# Patient Record
Sex: MALE | Race: WHITE | NOT HISPANIC OR LATINO
[De-identification: names, ages, dates, MRNs, and addresses within clinical notes are randomized per-mention and may not be internally consistent; named-entity substitution may affect disease eponyms.]

---

## 2018-10-29 ENCOUNTER — APPOINTMENT (OUTPATIENT)
Dept: INTERNAL MEDICINE | Facility: CLINIC | Age: 48
End: 2018-10-29
Payer: COMMERCIAL

## 2018-10-29 ENCOUNTER — TRANSCRIPTION ENCOUNTER (OUTPATIENT)
Age: 48
End: 2018-10-29

## 2018-10-29 ENCOUNTER — NON-APPOINTMENT (OUTPATIENT)
Age: 48
End: 2018-10-29

## 2018-10-29 VITALS
HEART RATE: 65 BPM | WEIGHT: 178 LBS | HEIGHT: 68.5 IN | SYSTOLIC BLOOD PRESSURE: 103 MMHG | OXYGEN SATURATION: 99 % | DIASTOLIC BLOOD PRESSURE: 71 MMHG | BODY MASS INDEX: 26.67 KG/M2 | TEMPERATURE: 97.8 F

## 2018-10-29 DIAGNOSIS — Z83.79 FAMILY HISTORY OF OTHER DISEASES OF THE DIGESTIVE SYSTEM: ICD-10-CM

## 2018-10-29 DIAGNOSIS — Z23 ENCOUNTER FOR IMMUNIZATION: ICD-10-CM

## 2018-10-29 DIAGNOSIS — Z80.42 FAMILY HISTORY OF MALIGNANT NEOPLASM OF PROSTATE: ICD-10-CM

## 2018-10-29 DIAGNOSIS — Z78.9 OTHER SPECIFIED HEALTH STATUS: ICD-10-CM

## 2018-10-29 DIAGNOSIS — Z12.5 ENCOUNTER FOR SCREENING FOR MALIGNANT NEOPLASM OF PROSTATE: ICD-10-CM

## 2018-10-29 DIAGNOSIS — Z11.3 ENCOUNTER FOR SCREENING FOR INFECTIONS WITH A PREDOMINANTLY SEXUAL MODE OF TRANSMISSION: ICD-10-CM

## 2018-10-29 PROCEDURE — 99386 PREV VISIT NEW AGE 40-64: CPT | Mod: 25

## 2018-10-29 PROCEDURE — G0444 DEPRESSION SCREEN ANNUAL: CPT

## 2018-10-29 PROCEDURE — 36415 COLL VENOUS BLD VENIPUNCTURE: CPT

## 2018-10-29 RX ORDER — MINOXIDIL 5 %
5 FOAM (GRAM) TOPICAL
Refills: 0 | Status: ACTIVE | COMMUNITY

## 2018-10-29 NOTE — HISTORY OF PRESENT ILLNESS
[de-identified] : 48 year old male patient came in to establish care and for preventive visit.\par \par Patient exercises 4 times weekly. 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 \par \par Patient is MSM is  to male partner. has been together for 10.5 years. They have been monogamous but they are opening the relationship. hasn’t been sexually active outside marriage yet. Admits to insertive anal intercourse. \par \par

## 2018-10-31 ENCOUNTER — TRANSCRIPTION ENCOUNTER (OUTPATIENT)
Age: 48
End: 2018-10-31

## 2018-10-31 LAB
ALBUMIN SERPL ELPH-MCNC: 5.1 G/DL
ALP BLD-CCNC: 74 U/L
ALT SERPL-CCNC: 29 U/L
ANION GAP SERPL CALC-SCNC: 14 MMOL/L
APPEARANCE: CLEAR
AST SERPL-CCNC: 39 U/L
BACTERIA: NEGATIVE
BASOPHILS # BLD AUTO: 0.06 K/UL
BASOPHILS NFR BLD AUTO: 1 %
BILIRUB SERPL-MCNC: 1.1 MG/DL
BILIRUBIN URINE: NEGATIVE
BLOOD URINE: NEGATIVE
BUN SERPL-MCNC: 18 MG/DL
C TRACH RRNA SPEC QL NAA+PROBE: NOT DETECTED
CALCIUM SERPL-MCNC: 10 MG/DL
CHLORIDE SERPL-SCNC: 100 MMOL/L
CHOLEST SERPL-MCNC: 207 MG/DL
CHOLEST/HDLC SERPL: 4.2 RATIO
CO2 SERPL-SCNC: 25 MMOL/L
COLOR: YELLOW
CREAT SERPL-MCNC: 1.27 MG/DL
EOSINOPHIL # BLD AUTO: 0.18 K/UL
EOSINOPHIL NFR BLD AUTO: 3 %
GLUCOSE QUALITATIVE U: NEGATIVE MG/DL
GLUCOSE SERPL-MCNC: 86 MG/DL
HBA1C MFR BLD HPLC: 5.4 %
HBV SURFACE AB SER QL: NONREACTIVE
HCT VFR BLD CALC: 47.7 %
HCV AB SER QL: NONREACTIVE
HCV S/CO RATIO: 0.42 S/CO
HDLC SERPL-MCNC: 49 MG/DL
HEPATITIS A IGG ANTIBODY: REACTIVE
HGB BLD-MCNC: 16.2 G/DL
HIV1+2 AB SPEC QL IA.RAPID: NONREACTIVE
IMM GRANULOCYTES NFR BLD AUTO: 0.2 %
KETONES URINE: NEGATIVE
LDLC SERPL CALC-MCNC: 104 MG/DL
LEUKOCYTE ESTERASE URINE: NEGATIVE
LYMPHOCYTES # BLD AUTO: 2.48 K/UL
LYMPHOCYTES NFR BLD AUTO: 41.5 %
MAN DIFF?: NORMAL
MCHC RBC-ENTMCNC: 30.4 PG
MCHC RBC-ENTMCNC: 34 GM/DL
MCV RBC AUTO: 89.5 FL
MICROSCOPIC-UA: NORMAL
MONOCYTES # BLD AUTO: 0.5 K/UL
MONOCYTES NFR BLD AUTO: 8.4 %
N GONORRHOEA RRNA SPEC QL NAA+PROBE: NOT DETECTED
NEUTROPHILS # BLD AUTO: 2.75 K/UL
NEUTROPHILS NFR BLD AUTO: 45.9 %
NITRITE URINE: NEGATIVE
PH URINE: 5.5
PLATELET # BLD AUTO: 200 K/UL
POTASSIUM SERPL-SCNC: 4.7 MMOL/L
PROT SERPL-MCNC: 8.2 G/DL
PROTEIN URINE: NEGATIVE MG/DL
PSA FREE FLD-MCNC: 17.5
PSA FREE SERPL-MCNC: 0.07 NG/ML
PSA SERPL-MCNC: 0.4 NG/ML
RBC # BLD: 5.33 M/UL
RBC # FLD: 12.4 %
RED BLOOD CELLS URINE: 0 /HPF
SODIUM SERPL-SCNC: 139 MMOL/L
SOURCE AMPLIFICATION: NORMAL
SOURCE ANAL: NORMAL
SOURCE ORAL: NORMAL
SPECIFIC GRAVITY URINE: 1.01
SQUAMOUS EPITHELIAL CELLS: 0 /HPF
T PALLIDUM AB SER QL IA: NEGATIVE
TRIGL SERPL-MCNC: 270 MG/DL
UROBILINOGEN URINE: NEGATIVE MG/DL
WBC # FLD AUTO: 5.98 K/UL
WHITE BLOOD CELLS URINE: 0 /HPF

## 2019-05-13 ENCOUNTER — APPOINTMENT (OUTPATIENT)
Dept: INTERNAL MEDICINE | Facility: CLINIC | Age: 49
End: 2019-05-13
Payer: COMMERCIAL

## 2019-05-13 VITALS
BODY MASS INDEX: 26.97 KG/M2 | OXYGEN SATURATION: 98 % | DIASTOLIC BLOOD PRESSURE: 81 MMHG | WEIGHT: 180 LBS | HEIGHT: 68.5 IN | HEART RATE: 59 BPM | SYSTOLIC BLOOD PRESSURE: 116 MMHG

## 2019-05-13 DIAGNOSIS — Z11.59 ENCOUNTER FOR SCREENING FOR OTHER VIRAL DISEASES: ICD-10-CM

## 2019-05-13 DIAGNOSIS — Z23 ENCOUNTER FOR IMMUNIZATION: ICD-10-CM

## 2019-05-13 PROCEDURE — 90471 IMMUNIZATION ADMIN: CPT

## 2019-05-13 PROCEDURE — 99213 OFFICE O/P EST LOW 20 MIN: CPT | Mod: 25

## 2019-05-13 PROCEDURE — 90746 HEPB VACCINE 3 DOSE ADULT IM: CPT

## 2019-05-13 NOTE — HISTORY OF PRESENT ILLNESS
[FreeTextEntry8] : 48 year old male patient came in with abdominal buldge and concern of abdominal hernia.\par Patient reports he has similar symptoms approximately 15 years ago when he felt a bulge in his right inguinal area, was not associated with pain at the time and self resolved in 5 years or so. patient as of couple of weeks ago started noticing a bulge close to his right groin area, associated with inguinal pain, moderate in severity, sharp, worsened with movement, improving with rest, that is progressing. Patient is concerned with having inguinal hernia.

## 2019-05-13 NOTE — PHYSICAL EXAM
[No Acute Distress] : no acute distress [PERRL] : pupils equal round and reactive to light [Well Nourished] : well nourished [Normal Sclera/Conjunctiva] : normal sclera/conjunctiva [Epididymis] : the epididymides were normal [Testes Tenderness] : no tenderness of the testes [Testes Mass (___cm)] : there were no testicular masses [No Skin Lesions] : no skin lesions [No Rash] : no rash [Coordination Grossly Intact] : coordination grossly intact [Normal Gait] : normal gait [Normal Affect] : the affect was normal [Normal Insight/Judgement] : insight and judgment were intact [de-identified] : right sided non reducible inguinal hernia.

## 2019-05-14 ENCOUNTER — TRANSCRIPTION ENCOUNTER (OUTPATIENT)
Age: 49
End: 2019-05-14

## 2019-05-15 ENCOUNTER — TRANSCRIPTION ENCOUNTER (OUTPATIENT)
Age: 49
End: 2019-05-15

## 2019-05-16 ENCOUNTER — TRANSCRIPTION ENCOUNTER (OUTPATIENT)
Age: 49
End: 2019-05-16

## 2019-05-16 LAB
MEV IGG FLD QL IA: 213 AU/ML
MEV IGG+IGM SER-IMP: POSITIVE
MUV AB SER-ACNC: POSITIVE
MUV IGG SER QL IA: 120 AU/ML
RUBV IGG FLD-ACNC: >33 INDEX
RUBV IGG SER-IMP: POSITIVE
VZV AB TITR SER: POSITIVE
VZV IGG SER IF-ACNC: 1253 INDEX

## 2019-05-17 ENCOUNTER — APPOINTMENT (OUTPATIENT)
Dept: ULTRASOUND IMAGING | Facility: CLINIC | Age: 49
End: 2019-05-17

## 2019-06-03 ENCOUNTER — APPOINTMENT (OUTPATIENT)
Dept: SURGERY | Facility: CLINIC | Age: 49
End: 2019-06-03
Payer: COMMERCIAL

## 2019-06-03 VITALS
OXYGEN SATURATION: 96 % | HEART RATE: 59 BPM | TEMPERATURE: 96.6 F | HEIGHT: 68.5 IN | BODY MASS INDEX: 28.04 KG/M2 | SYSTOLIC BLOOD PRESSURE: 120 MMHG | WEIGHT: 187.13 LBS | DIASTOLIC BLOOD PRESSURE: 73 MMHG

## 2019-06-03 DIAGNOSIS — R10.31 RIGHT LOWER QUADRANT PAIN: ICD-10-CM

## 2019-06-03 PROCEDURE — 99203 OFFICE O/P NEW LOW 30 MIN: CPT

## 2019-06-06 PROBLEM — R10.31 RIGHT INGUINAL PAIN: Status: ACTIVE | Noted: 2019-05-13

## 2019-07-15 ENCOUNTER — FORM ENCOUNTER (OUTPATIENT)
Age: 49
End: 2019-07-15

## 2019-07-16 ENCOUNTER — APPOINTMENT (OUTPATIENT)
Dept: INTERNAL MEDICINE | Facility: CLINIC | Age: 49
End: 2019-07-16
Payer: COMMERCIAL

## 2019-07-16 ENCOUNTER — APPOINTMENT (OUTPATIENT)
Dept: RADIOLOGY | Facility: CLINIC | Age: 49
End: 2019-07-16
Payer: COMMERCIAL

## 2019-07-16 ENCOUNTER — NON-APPOINTMENT (OUTPATIENT)
Age: 49
End: 2019-07-16

## 2019-07-16 ENCOUNTER — OUTPATIENT (OUTPATIENT)
Dept: OUTPATIENT SERVICES | Facility: HOSPITAL | Age: 49
LOS: 1 days | End: 2019-07-16

## 2019-07-16 VITALS
DIASTOLIC BLOOD PRESSURE: 76 MMHG | HEART RATE: 58 BPM | WEIGHT: 183 LBS | OXYGEN SATURATION: 95 % | HEIGHT: 68.5 IN | TEMPERATURE: 98.1 F | SYSTOLIC BLOOD PRESSURE: 126 MMHG | BODY MASS INDEX: 27.42 KG/M2

## 2019-07-16 DIAGNOSIS — Z01.818 ENCOUNTER FOR OTHER PREPROCEDURAL EXAMINATION: ICD-10-CM

## 2019-07-16 DIAGNOSIS — R05 COUGH: ICD-10-CM

## 2019-07-16 PROCEDURE — 93000 ELECTROCARDIOGRAM COMPLETE: CPT

## 2019-07-16 PROCEDURE — 99213 OFFICE O/P EST LOW 20 MIN: CPT | Mod: 25

## 2019-07-16 PROCEDURE — 36415 COLL VENOUS BLD VENIPUNCTURE: CPT

## 2019-07-16 PROCEDURE — 71046 X-RAY EXAM CHEST 2 VIEWS: CPT | Mod: 26

## 2019-07-16 RX ORDER — DICLOFENAC SODIUM 100 MG/1
100 TABLET, FILM COATED, EXTENDED RELEASE ORAL
Qty: 30 | Refills: 0 | Status: DISCONTINUED | COMMUNITY
Start: 2019-05-13 | End: 2019-07-16

## 2019-07-16 NOTE — ASSESSMENT
[High Risk Surgery - Intraperitoneal, Intrathoracic or Supringuinal Vascular Procedures] : High Risk Surgery - Intraperitoneal, Intrathoracic or Supringuinal Vascular Procedures - No (0) [Ischemic Heart Disease] : Ischemic Heart Disease - No (0) [Congestive Heart Failure] : Congestive Heart Failure - No (0) [Prior Cerebrovascular Accident or TIA] : Prior Cerebrovascular Accident or TIA - No (0) [Creatinine >= 2mg/dL (1 Point)] : Creatinine >= 2mg/dL - No (0) [Insulin-dependent Diabetic (1 Point)] : Insulin-dependent Diabetic - No (0) [Patient Optimized for Surgery] : Patient optimized for surgery [FreeTextEntry4] : Patient low risk going for intermediate risk procedure.

## 2019-07-16 NOTE — HISTORY OF PRESENT ILLNESS
[No Pertinent Cardiac History] : no history of aortic stenosis, atrial fibrillation, coronary artery disease, recent myocardial infarction, or implantable device/pacemaker [No Pertinent Pulmonary History] : no history of asthma, COPD, sleep apnea, or smoking [No Adverse Anesthesia Reaction] : no adverse anesthesia reaction in self or family member [(Patient denies any chest pain, claudication, dyspnea on exertion, orthopnea, palpitations or syncope)] : Patient denies any chest pain, claudication, dyspnea on exertion, orthopnea, palpitations or syncope [____ METs%] : [unfilled] METs% [Excellent (>10 METs)] : Excellent (>10 METs) [Aortic Stenosis] : no aortic stenosis [Atrial Fibrillation] : no atrial fibrillation [Coronary Artery Disease] : no coronary artery disease [Recent Myocardial Infarction] : no recent myocardial infarction [Implantable Device/Pacemaker] : no implantable device/pacemaker [Asthma] : no asthma [COPD] : no COPD [Sleep Apnea] : no sleep apnea [Smoker] : not a smoker [Family Member] : no family member with adverse anesthesia reaction/sudden death [Self] : no previous adverse anesthesia reaction [Chronic Anticoagulation] : no chronic anticoagulation [Chronic Kidney Disease] : no chronic kidney disease [Diabetes] : no diabetes [FreeTextEntry1] : inguinal hernia repair [FreeTextEntry2] : unknown  [FreeTextEntry3] : Dr Zendejas [FreeTextEntry4] : 49 year old male patient came in for preop clearance for inguinal hernia repair of the right side. \par Patient was diagnosed with hernia on May, admits discomfort from the hernia.\par Patient has excellent functional status.

## 2019-07-17 LAB
ALBUMIN SERPL ELPH-MCNC: 4.7 G/DL
ALP BLD-CCNC: 67 U/L
ALT SERPL-CCNC: 18 U/L
ANION GAP SERPL CALC-SCNC: 14 MMOL/L
APPEARANCE: CLEAR
APTT BLD: 32.3 SEC
AST SERPL-CCNC: 24 U/L
BACTERIA: NEGATIVE
BASOPHILS # BLD AUTO: 0.07 K/UL
BASOPHILS NFR BLD AUTO: 1.2 %
BILIRUB SERPL-MCNC: 1.1 MG/DL
BILIRUBIN URINE: NEGATIVE
BLOOD URINE: NEGATIVE
BUN SERPL-MCNC: 23 MG/DL
CALCIUM SERPL-MCNC: 9.5 MG/DL
CHLORIDE SERPL-SCNC: 103 MMOL/L
CO2 SERPL-SCNC: 23 MMOL/L
COLOR: NORMAL
CREAT SERPL-MCNC: 1.23 MG/DL
EOSINOPHIL # BLD AUTO: 0.15 K/UL
EOSINOPHIL NFR BLD AUTO: 2.7 %
GLUCOSE QUALITATIVE U: NEGATIVE
GLUCOSE SERPL-MCNC: 95 MG/DL
HCT VFR BLD CALC: 48.3 %
HGB BLD-MCNC: 15.7 G/DL
HYALINE CASTS: 0 /LPF
IMM GRANULOCYTES NFR BLD AUTO: 0.2 %
INR PPP: 1.05 RATIO
KETONES URINE: NEGATIVE
LEUKOCYTE ESTERASE URINE: NEGATIVE
LYMPHOCYTES # BLD AUTO: 2.34 K/UL
LYMPHOCYTES NFR BLD AUTO: 41.6 %
MAN DIFF?: NORMAL
MCHC RBC-ENTMCNC: 30.2 PG
MCHC RBC-ENTMCNC: 32.5 GM/DL
MCV RBC AUTO: 92.9 FL
MICROSCOPIC-UA: NORMAL
MONOCYTES # BLD AUTO: 0.58 K/UL
MONOCYTES NFR BLD AUTO: 10.3 %
NEUTROPHILS # BLD AUTO: 2.48 K/UL
NEUTROPHILS NFR BLD AUTO: 44 %
NITRITE URINE: NEGATIVE
PH URINE: 5.5
PLATELET # BLD AUTO: 195 K/UL
POTASSIUM SERPL-SCNC: 4.4 MMOL/L
PROT SERPL-MCNC: 7.5 G/DL
PROTEIN URINE: NEGATIVE
PT BLD: 12.1 SEC
RBC # BLD: 5.2 M/UL
RBC # FLD: 12.1 %
RED BLOOD CELLS URINE: 0 /HPF
SODIUM SERPL-SCNC: 140 MMOL/L
SPECIFIC GRAVITY URINE: 1.02
SQUAMOUS EPITHELIAL CELLS: 0 /HPF
UROBILINOGEN URINE: NORMAL
WBC # FLD AUTO: 5.63 K/UL
WHITE BLOOD CELLS URINE: 0 /HPF

## 2019-08-06 ENCOUNTER — OUTPATIENT (OUTPATIENT)
Dept: OUTPATIENT SERVICES | Facility: HOSPITAL | Age: 49
LOS: 1 days | Discharge: HOME | End: 2019-08-06
Payer: COMMERCIAL

## 2019-08-06 ENCOUNTER — TRANSCRIPTION ENCOUNTER (OUTPATIENT)
Age: 49
End: 2019-08-06

## 2019-08-06 PROCEDURE — 49650 LAP ING HERNIA REPAIR INIT: CPT

## 2019-08-06 PROCEDURE — S2900 ROBOTIC SURGICAL SYSTEM: CPT

## 2019-08-06 RX ORDER — IBUPROFEN 200 MG
1 TABLET ORAL
Qty: 28 | Refills: 0
Start: 2019-08-06 | End: 2019-08-12

## 2019-08-06 RX ORDER — OXYCODONE HYDROCHLORIDE 5 MG/1
1 TABLET ORAL
Qty: 28 | Refills: 0
Start: 2019-08-06 | End: 2019-08-12

## 2019-08-06 RX ORDER — ACETAMINOPHEN 500 MG
2 TABLET ORAL
Qty: 56 | Refills: 0
Start: 2019-08-06 | End: 2019-08-12

## 2019-08-13 ENCOUNTER — APPOINTMENT (OUTPATIENT)
Dept: SURGERY | Facility: CLINIC | Age: 49
End: 2019-08-13
Payer: COMMERCIAL

## 2019-08-13 VITALS
HEIGHT: 68.5 IN | WEIGHT: 183 LBS | DIASTOLIC BLOOD PRESSURE: 86 MMHG | OXYGEN SATURATION: 98 % | BODY MASS INDEX: 27.42 KG/M2 | HEART RATE: 67 BPM | TEMPERATURE: 97.9 F | SYSTOLIC BLOOD PRESSURE: 147 MMHG

## 2019-08-13 DIAGNOSIS — K40.90 UNILATERAL INGUINAL HERNIA, W/OUT OBSTRUCTION OR GANGRENE, NOT SPECIFIED AS RECURRENT: ICD-10-CM

## 2019-08-13 PROCEDURE — 99024 POSTOP FOLLOW-UP VISIT: CPT

## 2019-08-21 PROBLEM — K40.90 INGUINAL HERNIA, RIGHT: Status: ACTIVE | Noted: 2019-05-13

## 2019-08-29 NOTE — PHYSICAL EXAM
[Normal Breath Sounds] : Normal breath sounds [Normal Rate and Rhythm] : normal rate and rhythm [Alert] : alert [Oriented to Person] : oriented to person [Oriented to Time] : oriented to time [Calm] : calm [Enlarged] : not enlarged [Tender] : was nontender [Petechiae] : no petechiae [Purpura] : no purpura  [de-identified] : Pupils equal. No scleral icterus. NCAT. [de-identified] : Supple. No overt lymphadenopathy. No JVD. [de-identified] : NAD. Appropriate.Comfortable. [de-identified] : testicles descended bilaterally  [de-identified] : Abdomen is soft, nontender and non distended. Do not appreciate any overt mass. Laparoscopic incision sites are clean, dry and intact without signs of infection. No signs of hernia recurrence.

## 2019-08-29 NOTE — HISTORY OF PRESENT ILLNESS
[de-identified] : The patient is a 49 year old male who is s/p robotic assisted right inguinal hernia repair on 8/6/19. He is doing well, has been ambulating and started aerobic exercises at home. Denies nausea, vomiting, fevers, chills and no changes with urination or bowel movements.

## 2019-08-29 NOTE — ASSESSMENT
[FreeTextEntry1] : Case discussed with attending surgeon. The patient is a 49 year old male who is doing well post right inguinal hernia repair. Discussed natural scar maturation and gradual return to daily activities and no heavy lifting for 3-4 weeks. He will let us know if new or worsening symptoms. \par \par Surgery Attending: I did see/examine or discuss this patient with surgical PA. I agree with outlined assessment and plan. I answered all questions.\par

## 2021-05-03 ENCOUNTER — LABORATORY RESULT (OUTPATIENT)
Age: 51
End: 2021-05-03

## 2021-05-03 ENCOUNTER — APPOINTMENT (OUTPATIENT)
Dept: FAMILY MEDICINE | Facility: CLINIC | Age: 51
End: 2021-05-03
Payer: COMMERCIAL

## 2021-05-03 ENCOUNTER — NON-APPOINTMENT (OUTPATIENT)
Age: 51
End: 2021-05-03

## 2021-05-03 VITALS
OXYGEN SATURATION: 98 % | SYSTOLIC BLOOD PRESSURE: 120 MMHG | BODY MASS INDEX: 27.2 KG/M2 | HEIGHT: 70 IN | WEIGHT: 190 LBS | TEMPERATURE: 97.9 F | DIASTOLIC BLOOD PRESSURE: 74 MMHG | HEART RATE: 73 BPM

## 2021-05-03 DIAGNOSIS — Z13.29 ENCOUNTER FOR SCREENING FOR OTHER SUSPECTED ENDOCRINE DISORDER: ICD-10-CM
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
## 2021-05-03 DIAGNOSIS — Z00.00 ENCOUNTER FOR GENERAL ADULT MEDICAL EXAMINATION W/OUT ABNORMAL FINDINGS: ICD-10-CM

## 2021-05-03 DIAGNOSIS — M25.562 PAIN IN LEFT KNEE: ICD-10-CM

## 2021-05-03 DIAGNOSIS — S30.860A INSECT BITE (NONVENOMOUS) OF LOWER BACK AND PELVIS, INITIAL ENCOUNTER: ICD-10-CM

## 2021-05-03 DIAGNOSIS — Z13.228 ENCOUNTER FOR SCREENING FOR OTHER SUSPECTED ENDOCRINE DISORDER: ICD-10-CM

## 2021-05-03 DIAGNOSIS — Z13.0 ENCOUNTER FOR SCREENING FOR OTHER SUSPECTED ENDOCRINE DISORDER: ICD-10-CM

## 2021-05-03 PROCEDURE — 99386 PREV VISIT NEW AGE 40-64: CPT | Mod: 25

## 2021-05-03 PROCEDURE — 96127 BRIEF EMOTIONAL/BEHAV ASSMT: CPT

## 2021-05-03 PROCEDURE — 93000 ELECTROCARDIOGRAM COMPLETE: CPT

## 2021-05-03 PROCEDURE — 99072 ADDL SUPL MATRL&STAF TM PHE: CPT

## 2021-05-04 LAB
ALBUMIN SERPL ELPH-MCNC: 4.3 G/DL
ALP BLD-CCNC: 72 U/L
ALT SERPL-CCNC: 22 U/L
ANION GAP SERPL CALC-SCNC: 10 MMOL/L
AST SERPL-CCNC: 29 U/L
B BURGDOR IGG+IGM SER QL IB: NORMAL
BASOPHILS # BLD AUTO: 0.06 K/UL
BASOPHILS NFR BLD AUTO: 1.2 %
BILIRUB SERPL-MCNC: 0.7 MG/DL
BUN SERPL-MCNC: 16 MG/DL
CALCIUM SERPL-MCNC: 9.4 MG/DL
CHLORIDE SERPL-SCNC: 104 MMOL/L
CHOLEST SERPL-MCNC: 177 MG/DL
CO2 SERPL-SCNC: 26 MMOL/L
CREAT SERPL-MCNC: 1.25 MG/DL
EOSINOPHIL # BLD AUTO: 0.17 K/UL
EOSINOPHIL NFR BLD AUTO: 3.5 %
ESTIMATED AVERAGE GLUCOSE: 105 MG/DL
GLUCOSE SERPL-MCNC: 90 MG/DL
HBA1C MFR BLD HPLC: 5.3 %
HCT VFR BLD CALC: 44.2 %
HDLC SERPL-MCNC: 44 MG/DL
HGB BLD-MCNC: 15.3 G/DL
IMM GRANULOCYTES NFR BLD AUTO: 0.2 %
LDLC SERPL CALC-MCNC: 106 MG/DL
LYMPHOCYTES # BLD AUTO: 2.29 K/UL
LYMPHOCYTES NFR BLD AUTO: 47 %
MAN DIFF?: NORMAL
MCHC RBC-ENTMCNC: 30.6 PG
MCHC RBC-ENTMCNC: 34.6 GM/DL
MCV RBC AUTO: 88.4 FL
MONOCYTES # BLD AUTO: 0.52 K/UL
MONOCYTES NFR BLD AUTO: 10.7 %
NEUTROPHILS # BLD AUTO: 1.82 K/UL
NEUTROPHILS NFR BLD AUTO: 37.4 %
NONHDLC SERPL-MCNC: 132 MG/DL
PLATELET # BLD AUTO: 196 K/UL
POTASSIUM SERPL-SCNC: 4.8 MMOL/L
PROT SERPL-MCNC: 7 G/DL
PSA SERPL-MCNC: 0.8 NG/ML
RBC # BLD: 5 M/UL
RBC # FLD: 11.9 %
SODIUM SERPL-SCNC: 140 MMOL/L
T4 SERPL-MCNC: 6.9 UG/DL
TRIGL SERPL-MCNC: 131 MG/DL
TSH SERPL-ACNC: 2.19 UIU/ML
URATE SERPL-MCNC: 4.1 MG/DL
WBC # FLD AUTO: 4.87 K/UL

## 2021-06-14 ENCOUNTER — APPOINTMENT (OUTPATIENT)
Dept: ORTHOPEDIC SURGERY | Facility: CLINIC | Age: 51
End: 2021-06-14
Payer: COMMERCIAL

## 2021-06-14 VITALS
HEIGHT: 70 IN | HEART RATE: 60 BPM | SYSTOLIC BLOOD PRESSURE: 117 MMHG | DIASTOLIC BLOOD PRESSURE: 81 MMHG | WEIGHT: 190 LBS | BODY MASS INDEX: 27.2 KG/M2

## 2021-06-14 DIAGNOSIS — M22.2X2 PATELLOFEMORAL DISORDERS, LEFT KNEE: ICD-10-CM

## 2021-06-14 PROCEDURE — 99072 ADDL SUPL MATRL&STAF TM PHE: CPT

## 2021-06-14 PROCEDURE — 73564 X-RAY EXAM KNEE 4 OR MORE: CPT | Mod: LT

## 2021-06-14 PROCEDURE — 99204 OFFICE O/P NEW MOD 45 MIN: CPT

## 2021-06-14 RX ORDER — FINASTERIDE 1 MG/1
1 TABLET, FILM COATED ORAL
Refills: 0 | Status: DISCONTINUED | COMMUNITY
End: 2021-06-14

## 2021-06-14 NOTE — REVIEW OF SYSTEMS
yes [Joint Pain] : joint pain [Joint Stiffness] : joint stiffness [Negative] : Heme/Lymph [FreeTextEntry9] : L knee

## 2021-06-14 NOTE — PHYSICAL EXAM
[de-identified] : Physical Exam:\par General: Well appearing, no acute distress\par Neurologic: A&Ox3, No focal deficits\par Head: NCAT without abrasions, lacerations, or ecchymosis to head, face, or scalp\par Eyes: No scleral icterus, no gross abnormalities\par Respiratory: Equal chest wall expansion bilaterally, no accessory muscle use\par Lymphatic: No lymphadenopathy palpated\par Skin: Warm and dry\par Psychiatric: Normal mood and affect \par \par Left Knee: Range of Motion in Degrees	\par 	  Claimant:	Normal:	\par Flexion Active	 135 	 135-degrees	\par Flexion Passive	 135	 135-degrees	\par Extension Active	 0-5	 0-5-degrees	\par Extension Passive	 0-5	 0-5-degrees	\par \par Medial joint line tenderness. IT band stiffness. positive Florencia's. Ligaments normal otherwise. No weakness to flexion/extension. No evidence of instability in the AP plane or varus or valgus stress. Negative Lachman. Negative pivot shift. Negative anterior drawer test. Negative posterior drawer test. Negative Florencia. Negative Apley grind. No medial or lateral joint line tenderness. No tenderness over the medial and lateral facet of the patella. No patellofemoral crepitations. No lateral tilting patella. No patellar apprehension. No crepitation in the medial and lateral femoral condyle. No proximal or distal swelling, edema or tenderness. No gross motor or sensory deficits. No intra-articular swelling. 2+ DP and PT pulses. No varus or valgus malalignment. Skin is intact. No rashes, scars or lesions.\par \par Right Knee: Range of Motion in Degrees	\par 	  Claimant:	Normal:	\par Flexion Active	 135 	 135-degrees	\par Flexion Passive	 135	 135-degrees	\par Extension Active	 0-5	 0-5-degrees	\par Extension Passive	 0-5	 0-5-degrees	\par \par No weakness to flexion/extension. No evidence of instability in the AP plane or varus or valgus stress. Negative Lachman. Negative pivot shift. Negative anterior drawer test. Negative posterior drawer test. Negative Florencia. Negative Apley grind. No medial or lateral joint line tenderness. No tenderness over the medial and lateral facet of the patella. No patellofemoral crepitations. No lateral tilting patella. No patellar apprehension. No crepitation in the medial and lateral femoral condyle. No proximal or distal swelling, edema or tenderness. No gross motor or sensory deficits. No intra-articular swelling. 2+ DP and PT pulses. No varus or valgus malalignment. Skin is intact. No rashes, scars or lesions.  [de-identified] : 4 views of L knee were performed today and available for me to review. Results were discussed with the patient. They demonstrate mild to no joint space narrowing, cartilage loss at inner part of knee, otherwise no f/x, dislocation or other deformity.\par

## 2021-06-14 NOTE — DISCUSSION/SUMMARY
[de-identified] : JOHN is a 50 year male with Left knee pain secondary to IT band tendinitis and patellofemoral pain syndrome, and possible meniscus tear. I have explained to the patient the anatomy of the patellofemoral joint. It includes the extensor mechanism (quadriceps tendon, quadriceps muscles, patella, patella tendon, and medial and lateral retinaculum). I have shown the patient that the articular cartilage gets a little softened. This is what is known as chondromalacia. If one theoretically were to remove or scrape all the articular cartilage, it would be identified as arthritis. Somewhat paradoxically, however, chondromalacia does not necessarily lead to arthritis or at least there is no evidence irrefutable at this point in time.\par \par Chondromalacia or anterior knee pain is a syndrome that hurts the patients more than it causes destruction to the knee; thus pain is not associated with destruction. Likewise, noises, cracking, and popping for the most part are not anymore significant than people cracking their knuckles. It is certainly not a sign of damage to the joint.\par Over 90% of the people with patellofemoral problems will get better if they understand the weight bearing stresses that are applied to the patellofemoral joint. The forces can range from 2 to 9 times your body weight per step and with abnormal alignment the average is usually higher. \par \par The treatment is to minimize weight-bearing stress and to strengthen the surrounding muscles. From a practical point of view, one can divide their lifestyle into activities of daily living, conditioning, and recreation. In activities of daily living one should feel free to walk around as necessary but only for functioning. If one has an option between stairs and an escalator, the latter is preferable.\par \par The conditioning aspect should be a non weight bearing program which is best achieved by bicycle riding at least 3 to 4 days a week. It should be done with increasing resistance for at least a half hour. The seat of the bike should always be kept at a position so that the knee stays within a 0 to 90 degree arc. This will avoid hyperextension and flexion beyond 90 degrees, which tends to aggravate symptoms of discomfort. Leg extension for stretching exercises are similarly kept within this arc of motion. Step machines are not advised as they tend to aggravate patellofemoral symptoms as do squats. A [employer] machine is an alternative that is usually acceptable.\par \par The recreational part of their life is based on the fact that since pain is not leading to destruction, we will compromise and allow a recreational lifestyle. If indeed activity, albeit associated with impact does not yield any symptoms, they should feel free to participate. If an increase in activities is associated with increasing discomfort, the patient should use "common sense" and cut back on the level of activity. Recreation, however, is never to be used for conditioning even in an asymptomatic patient. The patient must always maintain a conditioning program. I think the patient understands this explanation.\par \par While over 90% of the people with this syndrome will do well non-operatively, some conscientious patients will still have symptoms. If so, they should be reevaluated to ascertain if they fall into a small category of people who require physical therapy or surgery.\par \par At this time he elected for nonoperative management with meloxicam and physical therapy. I gave him prescription for both. I will see him back in 8 weeks for clinical assessment. He agrees with the above plan and all questions were answered.

## 2021-06-14 NOTE — HISTORY OF PRESENT ILLNESS
[Worsening] : worsening [___ mths] : [unfilled] month(s) ago [3] : an average pain level of 3/10 [Bending] : worsened by bending [Running] : worsened by running [None] : No relieving factors are noted [de-identified] : JOHN SCHMITT is a 50 year male being seen for initial visit L knee pain. Patient was in the  for 22 years, but is currently inactive. He reports bilat knee pain, L > R. Patient reports he is a runner, but he is currently unable to run due to pain. He endorses pain over the posterior aspect of the L knee. He denies feelings of clicking and catching, but reports pain while going up and down stairs. Patient denies numbness and tingling to the extremities. He endorses use of OTC pain medications that provide mild relief. He has not done any formal PT.\par

## 2021-06-14 NOTE — ADDENDUM
[FreeTextEntry1] : Documented by Brian Tyson acting as a scribe for Dr. Cherry on 06/14/2021. \par \par All medical record entries made by the Scribe were at my, Dr. Cherry's, direction and\par personally dictated by me on 06/14/2021. I have reviewed the chart and agree that the record\par accurately reflects my personal performance of the history, physical exam, procedure and imaging.

## 2021-06-25 ENCOUNTER — NON-APPOINTMENT (OUTPATIENT)
Age: 51
End: 2021-06-25

## 2021-07-28 ENCOUNTER — APPOINTMENT (OUTPATIENT)
Dept: FAMILY MEDICINE | Facility: CLINIC | Age: 51
End: 2021-07-28
Payer: COMMERCIAL

## 2021-07-28 VITALS
WEIGHT: 185 LBS | DIASTOLIC BLOOD PRESSURE: 80 MMHG | HEIGHT: 70 IN | SYSTOLIC BLOOD PRESSURE: 142 MMHG | BODY MASS INDEX: 26.48 KG/M2 | TEMPERATURE: 97 F | HEART RATE: 50 BPM | OXYGEN SATURATION: 98 %

## 2021-07-28 DIAGNOSIS — F40.243 FEAR OF FLYING: ICD-10-CM

## 2021-07-28 PROCEDURE — 99213 OFFICE O/P EST LOW 20 MIN: CPT

## 2021-07-28 PROCEDURE — 99072 ADDL SUPL MATRL&STAF TM PHE: CPT

## 2021-08-13 ENCOUNTER — APPOINTMENT (OUTPATIENT)
Dept: ORTHOPEDIC SURGERY | Facility: CLINIC | Age: 51
End: 2021-08-13
Payer: COMMERCIAL

## 2021-08-13 DIAGNOSIS — M22.2X2 PATELLOFEMORAL DISORDERS, LEFT KNEE: ICD-10-CM

## 2021-08-13 PROCEDURE — 99072 ADDL SUPL MATRL&STAF TM PHE: CPT

## 2021-08-13 PROCEDURE — 99213 OFFICE O/P EST LOW 20 MIN: CPT

## 2021-08-13 NOTE — REVIEW OF SYSTEMS
[Joint Pain] : joint pain [Joint Stiffness] : joint stiffness [Negative] : Heme/Lymph [FreeTextEntry9] : L knee

## 2021-08-13 NOTE — PHYSICAL EXAM
[de-identified] : Physical Exam:\par General: Well appearing, no acute distress\par Neurologic: A&Ox3, No focal deficits\par Head: NCAT without abrasions, lacerations, or ecchymosis to head, face, or scalp\par Eyes: No scleral icterus, no gross abnormalities\par Respiratory: Equal chest wall expansion bilaterally, no accessory muscle use\par Lymphatic: No lymphadenopathy palpated\par Skin: Warm and dry\par Psychiatric: Normal mood and affect \par \par Left Knee: Range of Motion in Degrees	\par 	  Claimant:	Normal:	\par Flexion Active	 135 	 135-degrees	\par Flexion Passive	 135	 135-degrees	\par Extension Active	 0-5	 0-5-degrees	\par Extension Passive	 0-5	 0-5-degrees	\par \par Medial joint line tenderness. IT band stiffness. positive Florencia's. Ligaments normal otherwise. No weakness to flexion/extension. No evidence of instability in the AP plane or varus or valgus stress. Negative Lachman. Negative pivot shift. Negative anterior drawer test. Negative posterior drawer test. Negative Florencia. Negative Apley grind. No medial or lateral joint line tenderness. No tenderness over the medial and lateral facet of the patella. No patellofemoral crepitations. No lateral tilting patella. No patellar apprehension. No crepitation in the medial and lateral femoral condyle. No proximal or distal swelling, edema or tenderness. No gross motor or sensory deficits. No intra-articular swelling. 2+ DP and PT pulses. No varus or valgus malalignment. Skin is intact. No rashes, scars or lesions.\par \par Right Knee: Range of Motion in Degrees	\par 	  Claimant:	Normal:	\par Flexion Active	 135 	 135-degrees	\par Flexion Passive	 135	 135-degrees	\par Extension Active	 0-5	 0-5-degrees	\par Extension Passive	 0-5	 0-5-degrees	\par \par No weakness to flexion/extension. No evidence of instability in the AP plane or varus or valgus stress. Negative Lachman. Negative pivot shift. Negative anterior drawer test. Negative posterior drawer test. Negative Florencia. Negative Apley grind. No medial or lateral joint line tenderness. No tenderness over the medial and lateral facet of the patella. No patellofemoral crepitations. No lateral tilting patella. No patellar apprehension. No crepitation in the medial and lateral femoral condyle. No proximal or distal swelling, edema or tenderness. No gross motor or sensory deficits. No intra-articular swelling. 2+ DP and PT pulses. No varus or valgus malalignment. Skin is intact. No rashes, scars or lesions.

## 2021-08-13 NOTE — DISCUSSION/SUMMARY
[de-identified] : JOHN SCHMITT is a 50 year male being seen for f/u visit L knee pain. Currently, he reports relief in knee pain with meloxicam until his prescription ran out. Since the meloxicam was completed though he still reports relief more than prior to taking it. He denies new injury, catching or locking with a general pain around and under the knee cap. This pain does not awaken him at night. He has not performed any formal physical therapy. He notes that because his knee was feeling better, he tried to run which increased his R knee pain. \par \par We had a thorough discussion regarding the nature of his pain, the pathophysiology, as well as all treatment options. At this time I believe his primary source for pain to be secondary to patellofemoral pain. He did not take mobic x 21 days in a row, he was again educated on compliance of this medication. He was offered an MRI versus cortisone injection today that he defers. If and when his pain worsens he may come in to receive at anytime. He elects for another course of Meloxicam alongside formal PT 2x/week x 4-6 weeks. If he is pain free at that time he will follow up with us prn. He was also provided with an HEP today to perform. He agrees with the above plan and all questions were answered.\par

## 2021-08-13 NOTE — HISTORY OF PRESENT ILLNESS
[___ mths] : [unfilled] month(s) ago [Running] : worsened by running [None] : No relieving factors are noted [Improving] : improving [0] : a minimum pain level of 0/10 [1] : a maximum pain level of 1/10 [NSAIDs] : relieved by nonsteroidal anti-inflammatory drugs [de-identified] : JOHN SCHMITT is a 50 year male being seen for f/u visit L knee pain. Currently, he reports relief in knee pain with meloxicam until his prescription ran out. Since the meloxicam was completed though he still reports relief more than prior to taking it. He denies new injury, catching or locking with a general pain around and under the knee cap. This pain does not awaken him at night. He has not performed any formal physical therapy. He notes that because his knee was feeling better, he tried to run which increased his R knee pain.

## 2021-09-09 ENCOUNTER — APPOINTMENT (OUTPATIENT)
Dept: ORTHOPEDIC SURGERY | Facility: CLINIC | Age: 51
End: 2021-09-09

## 2021-11-22 ENCOUNTER — APPOINTMENT (OUTPATIENT)
Dept: FAMILY MEDICINE | Facility: CLINIC | Age: 51
End: 2021-11-22

## 2021-11-29 ENCOUNTER — APPOINTMENT (OUTPATIENT)
Dept: FAMILY MEDICINE | Facility: CLINIC | Age: 51
End: 2021-11-29

## 2021-11-30 ENCOUNTER — APPOINTMENT (OUTPATIENT)
Dept: DERMATOLOGY | Facility: CLINIC | Age: 51
End: 2021-11-30
Payer: COMMERCIAL

## 2021-11-30 DIAGNOSIS — D22.5 MELANOCYTIC NEVI OF TRUNK: ICD-10-CM

## 2021-11-30 DIAGNOSIS — D48.5 NEOPLASM OF UNCERTAIN BEHAVIOR OF SKIN: ICD-10-CM

## 2021-11-30 PROCEDURE — 99072 ADDL SUPL MATRL&STAF TM PHE: CPT

## 2021-11-30 PROCEDURE — 11102 TANGNTL BX SKIN SINGLE LES: CPT

## 2021-11-30 PROCEDURE — 99202 OFFICE O/P NEW SF 15 MIN: CPT | Mod: 25

## 2021-11-30 RX ORDER — MELOXICAM 7.5 MG/1
7.5 TABLET ORAL
Qty: 21 | Refills: 0 | Status: DISCONTINUED | COMMUNITY
Start: 2021-06-14 | End: 2021-11-30

## 2021-11-30 RX ORDER — MELOXICAM 7.5 MG/1
7.5 TABLET ORAL
Qty: 21 | Refills: 0 | Status: DISCONTINUED | COMMUNITY
Start: 2021-08-13 | End: 2021-11-30

## 2021-11-30 RX ORDER — ALPRAZOLAM 0.25 MG/1
0.25 TABLET ORAL
Qty: 20 | Refills: 0 | Status: DISCONTINUED | COMMUNITY
Start: 2021-07-28 | End: 2021-11-30

## 2021-11-30 NOTE — ASSESSMENT
[FreeTextEntry1] : Melanocytic nevi on trunk\par R/O pigmented BCC, malignant melanoma on right forearm

## 2021-11-30 NOTE — HISTORY OF PRESENT ILLNESS
[FreeTextEntry1] : Evaluation of growths. [de-identified] : First visit for 51-year-old white male referred by Pola Miranda D.O., for evaluation of growths. Particularly concerned about a lesion on the right forearm. No history of skin cancer.

## 2021-11-30 NOTE — PHYSICAL EXAM
[Alert] : alert [Oriented x 3] : ~L oriented x 3 [Well Nourished] : well nourished [FreeTextEntry3] : The following areas were examined and no significant abnormalities were seen except as noted below:\par \par Type II skin\par \par scalp, face, eyelids, nose, lips, ears, neck, chest, abdomen, back,groin, buttocks, right arm, left arm, right hand, left hand,\par right  leg, left leg, right foot, left foot\par \par Right mid forearm: 6 x 3 mm brown plaque\par Trunk: Moderate multiple small dark brown macules\par One larger tan plaque present on the upper back on right\par \par No other suspicious lesions seen

## 2021-11-30 NOTE — CONSULT LETTER
[Dear  ___] : Dear  [unfilled], [Consult Letter:] : I had the pleasure of evaluating your patient, [unfilled]. [Consult Closing:] : Thank you very much for allowing me to participate in the care of this patient.  If you have any questions, please do not hesitate to contact me. [Sincerely,] : Sincerely, [FreeTextEntry2] : Pola Miranda, NP [FreeTextEntry1] : Examination of the skin reveals a 6 x 3 mm brown and pink plaque on the right mid forearm.\par A biopsy was taken to rule out a possible pigmented basal cell carcinoma or malignant melanoma.\par \par The rest of the skin exam is remarkable for multiple small melanocytic nevi on the trunk. These can be observed. [FreeTextEntry3] : Jerod Manriquez MD\par [address], [address]\par [place] [address]\par Tel ([phone])\par Fax ([phone])\par Private line ([phone])\par

## 2021-12-08 ENCOUNTER — NON-APPOINTMENT (OUTPATIENT)
Age: 51
End: 2021-12-08

## 2022-04-11 PROBLEM — Z11.59 SCREENING FOR VIRAL DISEASE: Status: ACTIVE | Noted: 2019-05-13
